# Patient Record
Sex: MALE | Race: WHITE | Employment: OTHER | URBAN - METROPOLITAN AREA
[De-identification: names, ages, dates, MRNs, and addresses within clinical notes are randomized per-mention and may not be internally consistent; named-entity substitution may affect disease eponyms.]

---

## 2019-10-27 ENCOUNTER — HOSPITAL ENCOUNTER (EMERGENCY)
Age: 69
Discharge: HOME | End: 2019-10-27
Attending: EMERGENCY MEDICINE
Payer: COMMERCIAL

## 2019-10-27 ENCOUNTER — APPOINTMENT (OUTPATIENT)
Dept: CT IMAGING | Age: 69
End: 2019-10-27
Attending: EMERGENCY MEDICINE
Payer: COMMERCIAL

## 2019-10-27 VITALS
HEART RATE: 70 BPM | RESPIRATION RATE: 16 BRPM | WEIGHT: 180 LBS | BODY MASS INDEX: 28.25 KG/M2 | OXYGEN SATURATION: 97 % | HEIGHT: 67 IN | DIASTOLIC BLOOD PRESSURE: 79 MMHG | SYSTOLIC BLOOD PRESSURE: 138 MMHG | TEMPERATURE: 98.2 F

## 2019-10-27 DIAGNOSIS — N23 RENAL COLIC: Primary | ICD-10-CM

## 2019-10-27 LAB
ALBUMIN SERPL-MCNC: 3.8 G/DL (ref 3.2–4.6)
ALBUMIN/GLOB SERPL: 1.1 {RATIO} (ref 1.2–3.5)
ALP SERPL-CCNC: 117 U/L (ref 50–136)
ALT SERPL-CCNC: 17 U/L (ref 12–65)
ANION GAP SERPL CALC-SCNC: 8 MMOL/L (ref 7–16)
AST SERPL-CCNC: 13 U/L (ref 15–37)
BACTERIA URNS QL MICRO: 0 /HPF
BASOPHILS # BLD: 0.1 K/UL (ref 0–0.2)
BASOPHILS NFR BLD: 1 % (ref 0–2)
BILIRUB SERPL-MCNC: 0.4 MG/DL (ref 0.2–1.1)
BUN SERPL-MCNC: 14 MG/DL (ref 8–23)
CALCIUM SERPL-MCNC: 9.2 MG/DL (ref 8.3–10.4)
CASTS URNS QL MICRO: ABNORMAL /LPF
CHLORIDE SERPL-SCNC: 104 MMOL/L (ref 98–107)
CO2 SERPL-SCNC: 25 MMOL/L (ref 21–32)
CREAT SERPL-MCNC: 1.26 MG/DL (ref 0.8–1.5)
CRYSTALS URNS QL MICRO: 0 /LPF
DIFFERENTIAL METHOD BLD: ABNORMAL
EOSINOPHIL # BLD: 0.1 K/UL (ref 0–0.8)
EOSINOPHIL NFR BLD: 1 % (ref 0.5–7.8)
EPI CELLS #/AREA URNS HPF: ABNORMAL /HPF
ERYTHROCYTE [DISTWIDTH] IN BLOOD BY AUTOMATED COUNT: 13.9 % (ref 11.9–14.6)
GLOBULIN SER CALC-MCNC: 3.5 G/DL (ref 2.3–3.5)
GLUCOSE SERPL-MCNC: 104 MG/DL (ref 65–100)
HCT VFR BLD AUTO: 46.3 % (ref 41.1–50.3)
HGB BLD-MCNC: 15.2 G/DL (ref 13.6–17.2)
IMM GRANULOCYTES # BLD AUTO: 0.1 K/UL (ref 0–0.5)
IMM GRANULOCYTES NFR BLD AUTO: 1 % (ref 0–5)
LYMPHOCYTES # BLD: 3 K/UL (ref 0.5–4.6)
LYMPHOCYTES NFR BLD: 23 % (ref 13–44)
MCH RBC QN AUTO: 29.6 PG (ref 26.1–32.9)
MCHC RBC AUTO-ENTMCNC: 32.8 G/DL (ref 31.4–35)
MCV RBC AUTO: 90.3 FL (ref 79.6–97.8)
MONOCYTES # BLD: 0.8 K/UL (ref 0.1–1.3)
MONOCYTES NFR BLD: 6 % (ref 4–12)
MUCOUS THREADS URNS QL MICRO: ABNORMAL /LPF
NEUTS SEG # BLD: 8.8 K/UL (ref 1.7–8.2)
NEUTS SEG NFR BLD: 68 % (ref 43–78)
NRBC # BLD: 0 K/UL (ref 0–0.2)
PLATELET # BLD AUTO: 428 K/UL (ref 150–450)
PMV BLD AUTO: 9.2 FL (ref 9.4–12.3)
POTASSIUM SERPL-SCNC: 3.9 MMOL/L (ref 3.5–5.1)
PROT SERPL-MCNC: 7.3 G/DL (ref 6.3–8.2)
RBC # BLD AUTO: 5.13 M/UL (ref 4.23–5.6)
RBC #/AREA URNS HPF: >100 /HPF
SODIUM SERPL-SCNC: 137 MMOL/L (ref 136–145)
WBC # BLD AUTO: 12.9 K/UL (ref 4.3–11.1)
WBC URNS QL MICRO: ABNORMAL /HPF

## 2019-10-27 PROCEDURE — 81003 URINALYSIS AUTO W/O SCOPE: CPT | Performed by: EMERGENCY MEDICINE

## 2019-10-27 PROCEDURE — 99284 EMERGENCY DEPT VISIT MOD MDM: CPT | Performed by: EMERGENCY MEDICINE

## 2019-10-27 PROCEDURE — 74011250636 HC RX REV CODE- 250/636: Performed by: EMERGENCY MEDICINE

## 2019-10-27 PROCEDURE — 51798 US URINE CAPACITY MEASURE: CPT | Performed by: EMERGENCY MEDICINE

## 2019-10-27 PROCEDURE — 85025 COMPLETE CBC W/AUTO DIFF WBC: CPT

## 2019-10-27 PROCEDURE — 96361 HYDRATE IV INFUSION ADD-ON: CPT | Performed by: EMERGENCY MEDICINE

## 2019-10-27 PROCEDURE — 74011250637 HC RX REV CODE- 250/637: Performed by: EMERGENCY MEDICINE

## 2019-10-27 PROCEDURE — 81015 MICROSCOPIC EXAM OF URINE: CPT

## 2019-10-27 PROCEDURE — 96375 TX/PRO/DX INJ NEW DRUG ADDON: CPT | Performed by: EMERGENCY MEDICINE

## 2019-10-27 PROCEDURE — 96374 THER/PROPH/DIAG INJ IV PUSH: CPT | Performed by: EMERGENCY MEDICINE

## 2019-10-27 PROCEDURE — 80053 COMPREHEN METABOLIC PANEL: CPT

## 2019-10-27 PROCEDURE — 74176 CT ABD & PELVIS W/O CONTRAST: CPT

## 2019-10-27 RX ORDER — TAMSULOSIN HYDROCHLORIDE 0.4 MG/1
0.4 CAPSULE ORAL ONCE
Status: COMPLETED | OUTPATIENT
Start: 2019-10-27 | End: 2019-10-27

## 2019-10-27 RX ORDER — HYDROCODONE BITARTRATE AND ACETAMINOPHEN 5; 325 MG/1; MG/1
1-2 TABLET ORAL
Qty: 20 TAB | Refills: 0 | Status: SHIPPED | OUTPATIENT
Start: 2019-10-27 | End: 2019-10-30

## 2019-10-27 RX ORDER — HYDROMORPHONE HYDROCHLORIDE 1 MG/ML
1 INJECTION, SOLUTION INTRAMUSCULAR; INTRAVENOUS; SUBCUTANEOUS
Status: COMPLETED | OUTPATIENT
Start: 2019-10-27 | End: 2019-10-27

## 2019-10-27 RX ORDER — ONDANSETRON 2 MG/ML
4 INJECTION INTRAMUSCULAR; INTRAVENOUS
Status: COMPLETED | OUTPATIENT
Start: 2019-10-27 | End: 2019-10-27

## 2019-10-27 RX ORDER — TAMSULOSIN HYDROCHLORIDE 0.4 MG/1
0.4 CAPSULE ORAL DAILY
Qty: 15 CAP | Refills: 0 | Status: SHIPPED | OUTPATIENT
Start: 2019-10-27 | End: 2019-11-11

## 2019-10-27 RX ORDER — IBUPROFEN 800 MG/1
800 TABLET ORAL
Qty: 20 TAB | Refills: 0 | Status: SHIPPED | OUTPATIENT
Start: 2019-10-27 | End: 2019-11-03

## 2019-10-27 RX ORDER — DOCUSATE SODIUM 100 MG/1
200 CAPSULE, LIQUID FILLED ORAL DAILY
Qty: 62 CAP | Refills: 0 | Status: SHIPPED | OUTPATIENT
Start: 2019-10-27 | End: 2019-10-28

## 2019-10-27 RX ORDER — KETOROLAC TROMETHAMINE 30 MG/ML
30 INJECTION, SOLUTION INTRAMUSCULAR; INTRAVENOUS
Status: COMPLETED | OUTPATIENT
Start: 2019-10-27 | End: 2019-10-27

## 2019-10-27 RX ORDER — ONDANSETRON 8 MG/1
8 TABLET, ORALLY DISINTEGRATING ORAL
Qty: 12 TAB | Refills: 1 | Status: SHIPPED | OUTPATIENT
Start: 2019-10-27

## 2019-10-27 RX ADMIN — TAMSULOSIN HYDROCHLORIDE 0.4 MG: 0.4 CAPSULE ORAL at 10:35

## 2019-10-27 RX ADMIN — SODIUM CHLORIDE 1000 ML: 900 INJECTION, SOLUTION INTRAVENOUS at 10:39

## 2019-10-27 RX ADMIN — HYDROMORPHONE HYDROCHLORIDE 1 MG: 1 INJECTION, SOLUTION INTRAMUSCULAR; INTRAVENOUS; SUBCUTANEOUS at 10:35

## 2019-10-27 RX ADMIN — KETOROLAC TROMETHAMINE 30 MG: 30 INJECTION, SOLUTION INTRAMUSCULAR at 10:35

## 2019-10-27 RX ADMIN — ONDANSETRON 4 MG: 2 INJECTION INTRAMUSCULAR; INTRAVENOUS at 10:35

## 2019-10-27 NOTE — ED NOTES
I have reviewed discharge instructions with the patient and spouse. The patient and spouse verbalized understanding. Patient left ED via Discharge Method: ambulatory to Home with spouse. Opportunity for questions and clarification provided. Patient given 5 scripts. To continue your aftercare when you leave the hospital, you may receive an automated call from our care team to check in on how you are doing. This is a free service and part of our promise to provide the best care and service to meet your aftercare needs.  If you have questions, or wish to unsubscribe from this service please call [phone]. Thank you for Choosing our [facility] Emergency Department.

## 2019-10-27 NOTE — ED PROVIDER NOTES
25-year-old gentleman with history of 5 or 6 kidney stones in the past some requiring intervention presents with flank pain on the left side onset last night around 9 PM.  Patient has had nausea vomiting, states the pain is sharp and radiates to the left lower quadrant. Patient indicates the pain is similar to previous episodes of renal colic. Patient states he \"cannot pee\", and \"cannot move his bowels\". Last bowel movement was yesterday morning, patient voided a small amount at triage and had a post void residual by bladder scan of May be 69 cc. Past Medical History:   Diagnosis Date    Chronic back pain        No past surgical history on file. No family history on file.     Social History     Socioeconomic History    Marital status:      Spouse name: Not on file    Number of children: Not on file    Years of education: Not on file    Highest education level: Not on file   Occupational History    Not on file   Social Needs    Financial resource strain: Not on file    Food insecurity:     Worry: Not on file     Inability: Not on file    Transportation needs:     Medical: Not on file     Non-medical: Not on file   Tobacco Use    Smoking status: Not on file   Substance and Sexual Activity    Alcohol use: Not on file    Drug use: Not on file    Sexual activity: Not on file   Lifestyle    Physical activity:     Days per week: Not on file     Minutes per session: Not on file    Stress: Not on file   Relationships    Social connections:     Talks on phone: Not on file     Gets together: Not on file     Attends [employer] service: Not on file     Active member of club or organization: Not on file     Attends meetings of clubs or organizations: Not on file     Relationship status: Not on file    Intimate partner violence:     Fear of current or ex partner: Not on file     Emotionally abused: Not on file     Physically abused: Not on file     Forced sexual activity: Not on file Other Topics Concern    Not on file   Social History Narrative    Not on file         ALLERGIES: Patient has no known allergies. Review of Systems   Constitutional: Negative for chills and fever. HENT: Negative for rhinorrhea and sore throat. Eyes: Negative for discharge and redness. Respiratory: Negative for cough and shortness of breath. Cardiovascular: Negative for chest pain and palpitations. Gastrointestinal: Positive for abdominal pain, constipation, nausea and vomiting. Negative for diarrhea. Genitourinary: Positive for difficulty urinating. Musculoskeletal: Positive for back pain. Negative for arthralgias. Skin: Negative for rash. Neurological: Negative for dizziness and headaches. All other systems reviewed and are negative. Vitals:    10/27/19 0949   BP: (!) 160/91   Pulse: 89   Resp: 16   Temp: 98.7 °F (37.1 °C)   SpO2: 99%   Weight: 81.6 kg (180 lb)   Height: 5' 7\" (1.702 m)            Physical Exam   Constitutional: He is oriented to person, place, and time. He appears well-developed and well-nourished. He appears distressed. HENT:   Head: Normocephalic and atraumatic. Eyes: Pupils are equal, round, and reactive to light. Conjunctivae are normal. Right eye exhibits no discharge. Left eye exhibits no discharge. No scleral icterus. Neck: Normal range of motion. Neck supple. Cardiovascular: Normal rate, regular rhythm and normal heart sounds. Exam reveals no gallop. No murmur heard. Pulmonary/Chest: Effort normal and breath sounds normal. No respiratory distress. He has no wheezes. He has no rales. Abdominal: Soft. There is tenderness in the left upper quadrant and left lower quadrant. There is CVA tenderness. There is no guarding. Musculoskeletal: Normal range of motion. He exhibits tenderness. He exhibits no edema. Thoracic back: He exhibits tenderness. He exhibits no bony tenderness.         Back:    Neurological: He is alert and oriented to person, place, and time. He exhibits normal muscle tone. cni 2-12 grossly   Skin: Skin is warm and dry. He is not diaphoretic. Psychiatric: He has a normal mood and affect. His behavior is normal.   Nursing note and vitals reviewed. MDM  Number of Diagnoses or Management Options  Diagnosis management comments: Medical decision making note:  Left flank pain suggestive of renal colic. Will check urine labs and CT scan. Patient reports constipation but has had a bowel movement within the last 24 hours  This concludes the \"medical decision making note\" part of this emergency department visit note.            Procedures

## 2019-10-27 NOTE — DISCHARGE INSTRUCTIONS
Take flomax (starting tomorrow) - once a day till stone has passed  Phenergan as needed for nasuea  Ibuprofen as needed for milder pain  norco as needed for stronger pain  Strain urine  Return to the e.r. if worse, fever>101, signs of urinary infection, or pain is out of control despite medicines  Follow up with urologist provided if stone hasn't passed in a few days      Patient Education        Kidney Stone: Care Instructions  Your Care Instructions    Kidney stones are formed when salts, minerals, and other substances normally found in the urine clump together. They can be as small as grains of sand or, rarely, as large as golf balls. While the stone is traveling through the ureter, which is the tube that carries urine from the kidney to the bladder, you will probably feel pain. The pain may be mild or very severe. You may also have some blood in your urine. As soon as the stone reaches the bladder, any intense pain should go away. If a stone is too large to pass on its own, you may need a medical procedure to help you pass the stone. The doctor has checked you carefully, but problems can develop later. If you notice any problems or new symptoms, get medical treatment right away. Follow-up care is a key part of your treatment and safety. Be sure to make and go to all appointments, and call your doctor if you are having problems. It's also a good idea to know your test results and keep a list of the medicines you take. How can you care for yourself at home? · Drink plenty of fluids, enough so that your urine is light yellow or clear like water. If you have kidney, heart, or liver disease and have to limit fluids, talk with your doctor before you increase the amount of fluids you drink. · Take pain medicines exactly as directed. Call your doctor if you think you are having a problem with your medicine. ? If the doctor gave you a prescription medicine for pain, take it as prescribed.   ? If you are not taking a prescription pain medicine, ask your doctor if you can take an over-the-counter medicine. Read and follow all instructions on the label. · Your doctor may ask you to strain your urine so that you can collect your kidney stone when it passes. You can use a kitchen strainer or a tea strainer to catch the stone. Store it in a plastic bag until you see your doctor again. Preventing future kidney stones  Some changes in your diet may help prevent kidney stones. Depending on the cause of your stones, your doctor may recommend that you:  · Drink plenty of fluids, enough so that your urine is light yellow or clear like water. If you have kidney, heart, or liver disease and have to limit fluids, talk with your doctor before you increase the amount of fluids you drink. · Limit coffee, tea, and alcohol. Also avoid grapefruit juice. · Do not take more than the recommended daily dose of vitamins C and D.  · Avoid antacids such as Gaviscon, Maalox, Mylanta, or Tums. · Limit the amount of salt (sodium) in your diet. · Eat a balanced diet that is not too high in protein. · Limit foods that are high in a substance called oxalate, which can cause kidney stones. These foods include dark green vegetables, rhubarb, chocolate, wheat bran, nuts, cranberries, and beans. When should you call for help? Call your doctor now or seek immediate medical care if:    · You cannot keep down fluids.     · Your pain gets worse.     · You have a fever or chills.     · You have new or worse pain in your back just below your rib cage (the flank area).     · You have new or more blood in your urine.    Watch closely for changes in your health, and be sure to contact your doctor if:    · You do not get better as expected. Where can you learn more? Go to http://johnathon-elza.info/. Enter Z610 in the search box to learn more about \"Kidney Stone: Care Instructions. \"  Current as of: October 31, 2018  Content Version: 12.2  © 1831-5105 [employer]. Care instructions adapted under license by [employer] (which disclaims liability or warranty for this information). If you have questions about a medical condition or this instruction, always ask your healthcare professional. [address] any warranty or liability for your use of this information.

## 2019-10-27 NOTE — ED TRIAGE NOTES
Pt walks into triage with c/o of flank pain. Pt hx of kidney stones. States it has been 8 hours since he has urinated. Denies hematuria when he did urinate. Pt states fever feeling. Did not take temp at home.  Pt in pain

## 2024-06-28 DIAGNOSIS — R06.83 SNORING: Primary | ICD-10-CM

## 2024-06-28 NOTE — PROGRESS NOTES
Janeen Nicole, Domitila BROWN, MA  Outside referral from [facility] Fat[facility]. Comprehensive auth notes sx snoring, witnessed apneas and excessive daytime sleepiness. No previous study     [id number]  preliminary exp 11/16/24

## 2024-11-27 ENCOUNTER — PREP FOR PROCEDURE (OUTPATIENT)
Dept: SURGERY | Age: 74
End: 2024-11-27

## 2024-11-27 ENCOUNTER — OFFICE VISIT (OUTPATIENT)
Dept: SURGERY | Age: 74
End: 2024-11-27
Payer: OTHER GOVERNMENT

## 2024-11-27 VITALS
HEART RATE: 84 BPM | DIASTOLIC BLOOD PRESSURE: 86 MMHG | HEIGHT: 68 IN | WEIGHT: 182 LBS | BODY MASS INDEX: 27.58 KG/M2 | SYSTOLIC BLOOD PRESSURE: 144 MMHG

## 2024-11-27 DIAGNOSIS — L72.3 SEBACEOUS CYST: ICD-10-CM

## 2024-11-27 DIAGNOSIS — L72.3 SEBACEOUS CYST: Primary | ICD-10-CM

## 2024-11-27 PROCEDURE — 99203 OFFICE O/P NEW LOW 30 MIN: CPT | Performed by: SURGERY

## 2024-11-27 PROCEDURE — 1123F ACP DISCUSS/DSCN MKR DOCD: CPT | Performed by: SURGERY

## 2024-11-27 NOTE — PROGRESS NOTES
GENERAL SURGERY NEW PATIENT NOTE    PRIMARY CARE PROVIDER:No primary care provider on file.    REFERRING PHYSICIAN: Donna Mchugh    Dear Dr. Mchugh,    I had the pleasure of seeing your patient, MARI Camacho, in the General Surgery Clinic at [facility]. My findings and recommendations are as follows:    Date of visit: 11/27/24    CHIEF COMPLAINT: Sebaceous cyst abdomen and cheek    HISTORY OF PRESENT ILLNESS: MARI Camacho is a 74 y.o. male with history of chronic back pain, nephrolithiasis, and diverticulosis referred to me for evaluation of sebaceous cyst on the abdomen and cheek.        Prior Abdominal Surgery: None  Cardiac History: None  Pulmonary History: None  Blood Thinners: None      The following labs/images/reports were reviewed:    Imaging:    CT Result (most recent):  CT ABDOMEN PELVIS WO IV CONTRAST 10/27/2019    Narrative  CT abdomen and pelvis without contrast    CLINICAL INDICATION: Acute moderate left flank pain, subjective fever, dysuria;  history of left renal stones, scoliosis. Labs demonstrate leukocytosis today.    COMPARISON: CT 1/19/2013 and 10/11/2006    TECHNIQUE: Automated exposure Control was used. Multiple contiguous axial CT  images were obtained through the abdomen and pelvis without intravenous or oral  contrast. Coronal reformatted images are obtained to further evaluate organs.    FINDINGS: Partially included lung bases are unremarkable.    Abdomen: Right kidney and ureter demonstrate no stone or dilatation. Left ureter  demonstrates a 5 mm stone at the level of the pelvic inlet, with mild  hydroureteronephrosis and perinephric stranding, and a nonobstructing 3 mm stone  in the left renal collecting system. There is no evidence of renal mass on  limited non-contrast evaluation.    No acute abnormalities are seen in the noncontrasted visualized portions of the  liver or spleen.  No suspicious gallbladder, adrenal or pancreas lesions. Tiny  unchanged hypodensity in the 
exposure Control was used. Multiple contiguous axial CT  images were obtained through the abdomen and pelvis without intravenous or oral  contrast. Coronal reformatted images are obtained to further evaluate organs.    FINDINGS: Partially included lung bases are unremarkable.    Abdomen: Right kidney and ureter demonstrate no stone or dilatation. Left ureter  demonstrates a 5 mm stone at the level of the pelvic inlet, with mild  hydroureteronephrosis and perinephric stranding, and a nonobstructing 3 mm stone  in the left renal collecting system. There is no evidence of renal mass on  limited non-contrast evaluation.    No acute abnormalities are seen in the noncontrasted visualized portions of the  liver or spleen.  No suspicious gallbladder, adrenal or pancreas lesions. Tiny  unchanged hypodensity in the spleen of doubtful significance. No free air, or  focal inflammatory changes or fluid collections in the abdomen.  No evidence of  bowel obstruction. Diverticulosis of large bowel with no obvious diverticulitis  on limited noncontrast evaluation. Moderate stool. Normal right lower quadrant  appendix. Scattered calcifications of aorta. Aorta normal caliber.    Pelvis CT: There are no other distal ureteral or bladder calculi. Urinary  bladder is mostly decompressed which may account for nonspecific wall  thickening, or cystitis could do this. Prostate does not appear enlarged. No  focal inflammatory changes or fluid collections in the pelvis. No evidence of  lymphadenopathy. Suggestion of small bilateral varicoceles. Small fat-containing  inguinal hernia on the right, slightly increased since prior.    Bones: No acute osseous lesion. Mild scoliosis and degenerative changes again  noted of spine.    Impression  IMPRESSION:  1. Left renal and ureteral stones, mild hydroureteronephrosis.  2. Diverticulosis.       REVIEW OF SYSTEMS:    Constitutional: no weight loss, no fevers or night sweats    Eyes: No pain, redness or

## 2024-11-27 NOTE — H&P (VIEW-ONLY)
GENERAL SURGERY NEW PATIENT NOTE    PRIMARY CARE PROVIDER:No primary care provider on file.    REFERRING PHYSICIAN: Donna Mchugh    Dear Dr. Mchugh,    I had the pleasure of seeing your patient, MARI Camacho, in the General Surgery Clinic at [facility]. My findings and recommendations are as follows:    Date of visit: 11/27/24    CHIEF COMPLAINT: Sebaceous cyst abdomen and cheek    HISTORY OF PRESENT ILLNESS: MARI Camacho is a 74 y.o. male with history of chronic back pain, nephrolithiasis, and diverticulosis referred to me for evaluation of sebaceous cyst on the abdomen and cheek.    Patient reports he first noticed a lump over his right upper quadrant abdomen and left cheek approximately 5 years ago that started out as a blackhead at both locations.  He states that both locations of the lump slowly grew over time.  He states the abdominal masses gotten the biggest.  He also describes a small mass at the left shoulder that started about a year ago also was a blackhead and continue to slowly grow over time.  He describes that all 3 of these masses are somewhat uncomfortable and bothersome as they have enlarged over time.  He denies any drainage or redness over the areas.  He has never had any localized pain over these areas.  He denies any fevers, chills, chest pain, shortness of breath, dizziness, lightheadedness, nausea, vomiting, diarrhea, unintentional weight loss, or night sweats.      Prior Abdominal Surgery: None  Cardiac History: None  Pulmonary History: None  Blood Thinners: None      The following labs/images/reports were reviewed:    Imaging:    CT Result (most recent):  CT ABDOMEN PELVIS WO IV CONTRAST 10/27/2019    Narrative  CT abdomen and pelvis without contrast    CLINICAL INDICATION: Acute moderate left flank pain, subjective fever, dysuria;  history of left renal stones, scoliosis. Labs demonstrate leukocytosis today.    COMPARISON: CT 1/19/2013 and 10/11/2006    TECHNIQUE: Automated

## 2024-11-28 RX ORDER — SODIUM CHLORIDE 0.9 % (FLUSH) 0.9 %
5-40 SYRINGE (ML) INJECTION EVERY 12 HOURS SCHEDULED
Status: CANCELLED | OUTPATIENT
Start: 2024-11-28

## 2024-11-28 RX ORDER — SODIUM CHLORIDE 0.9 % (FLUSH) 0.9 %
5-40 SYRINGE (ML) INJECTION PRN
Status: CANCELLED | OUTPATIENT
Start: 2024-11-28

## 2024-11-28 RX ORDER — SODIUM CHLORIDE 9 MG/ML
INJECTION, SOLUTION INTRAVENOUS PRN
Status: CANCELLED | OUTPATIENT
Start: 2024-11-28

## 2024-12-10 RX ORDER — ACETAMINOPHEN 500 MG
1000 TABLET ORAL EVERY 6 HOURS PRN
COMMUNITY

## 2024-12-10 RX ORDER — TAMSULOSIN HYDROCHLORIDE 0.4 MG/1
0.4 CAPSULE ORAL DAILY
COMMUNITY

## 2024-12-10 RX ORDER — METHOCARBAMOL 500 MG/1
500 TABLET, FILM COATED ORAL NIGHTLY
COMMUNITY

## 2024-12-10 RX ORDER — FAMOTIDINE 20 MG/1
20 TABLET, FILM COATED ORAL 2 TIMES DAILY
COMMUNITY

## 2024-12-10 NOTE — PERIOP NOTE
Patient verified name and .  Order for consent  was found in EHR and does match case posting; patient verifies procedure.   Type 1B surgery, phone assessment complete.  Orders  received.  Labs per surgeon: none ordered  Labs per anesthesia protocol: not indicated    Patient answered medical/surgical history questions at their best of ability. All prior to admission medications documented in EPIC.    Patient instructed to continue taking all prescription medications up to the day of surgery but to take only the following medications the day of surgery according to anesthesia guidelines with a small sip of water: tramadol if needed, tamsulosin, famotidine.  Also, patient is requested to take 2 Tylenol in the morning and then again before bed on the day before surgery. Regular or extra strength may be used.       Patient informed that all vitamins and supplements should be held 7 days prior to surgery and NSAIDS 5 days prior to surgery. Prescription meds to hold:  none    Patient instructed on the following:    > Arrive at main Entrance, time of arrival to be called the day before by 1700  > NPO after midnight, unless otherwise indicated, including gum, mints, and ice chips  > Please drink 32 ounces of non-caffeinated clear liquids 2 hours prior to your arrival to avoid dehydration. (Per anesthesia)  > Responsible adult must drive patient to the hospital, stay during surgery, and patient will need supervision 24 hours after anesthesia  > Use non moisturizing soap in shower the night before surgery and on the morning of surgery  > All piercings must be removed prior to arrival.    > Leave all valuables (money and jewelry) at home but bring insurance card and ID on DOS.   > You may be required to pay a deductible or co-pay on the day of your procedure. You can pre-pay by calling [phone] if your surgery is at the [facility] or [phone] if your surgery is at the [facility].  > Do not wear make-up, nail

## 2024-12-16 ENCOUNTER — ANESTHESIA (OUTPATIENT)
Dept: SURGERY | Age: 74
End: 2024-12-16
Payer: OTHER GOVERNMENT

## 2024-12-16 ENCOUNTER — HOSPITAL ENCOUNTER (OUTPATIENT)
Age: 74
Setting detail: OUTPATIENT SURGERY
Discharge: HOME | End: 2024-12-16
Attending: SURGERY | Admitting: SURGERY
Payer: OTHER GOVERNMENT

## 2024-12-16 ENCOUNTER — ANESTHESIA EVENT (OUTPATIENT)
Dept: SURGERY | Age: 74
End: 2024-12-16
Payer: OTHER GOVERNMENT

## 2024-12-16 VITALS
BODY MASS INDEX: 28.19 KG/M2 | RESPIRATION RATE: 16 BRPM | WEIGHT: 186 LBS | OXYGEN SATURATION: 97 % | TEMPERATURE: 97.7 F | SYSTOLIC BLOOD PRESSURE: 118 MMHG | HEIGHT: 68 IN | HEART RATE: 55 BPM | DIASTOLIC BLOOD PRESSURE: 72 MMHG

## 2024-12-16 DIAGNOSIS — L72.3 SEBACEOUS CYST: Primary | ICD-10-CM

## 2024-12-16 PROCEDURE — 3700000000 HC ANESTHESIA ATTENDED CARE: Performed by: SURGERY

## 2024-12-16 PROCEDURE — 3600000012 HC SURGERY LEVEL 2 ADDTL 15MIN: Performed by: SURGERY

## 2024-12-16 PROCEDURE — 2709999900 HC NON-CHARGEABLE SUPPLY: Performed by: SURGERY

## 2024-12-16 PROCEDURE — 6360000002 HC RX W HCPCS: Performed by: REGISTERED NURSE

## 2024-12-16 PROCEDURE — 7100000011 HC PHASE II RECOVERY - ADDTL 15 MIN: Performed by: SURGERY

## 2024-12-16 PROCEDURE — 3600000002 HC SURGERY LEVEL 2 BASE: Performed by: SURGERY

## 2024-12-16 PROCEDURE — 6360000002 HC RX W HCPCS: Performed by: SURGERY

## 2024-12-16 PROCEDURE — 3700000001 HC ADD 15 MINUTES (ANESTHESIA): Performed by: SURGERY

## 2024-12-16 PROCEDURE — 12032 INTMD RPR S/A/T/EXT 2.6-7.5: CPT | Performed by: SURGERY

## 2024-12-16 PROCEDURE — 11406 EXC TR-EXT B9+MARG >4.0 CM: CPT | Performed by: SURGERY

## 2024-12-16 PROCEDURE — 6370000000 HC RX 637 (ALT 250 FOR IP): Performed by: ANESTHESIOLOGY

## 2024-12-16 PROCEDURE — 2580000003 HC RX 258: Performed by: SURGERY

## 2024-12-16 PROCEDURE — 7100000010 HC PHASE II RECOVERY - FIRST 15 MIN: Performed by: SURGERY

## 2024-12-16 PROCEDURE — 11442 EXC FACE-MM B9+MARG 1.1-2 CM: CPT | Performed by: SURGERY

## 2024-12-16 PROCEDURE — 11401 EXC TR-EXT B9+MARG 0.6-1 CM: CPT | Performed by: SURGERY

## 2024-12-16 PROCEDURE — 88304 TISSUE EXAM BY PATHOLOGIST: CPT

## 2024-12-16 PROCEDURE — 7100000000 HC PACU RECOVERY - FIRST 15 MIN: Performed by: SURGERY

## 2024-12-16 PROCEDURE — 2500000003 HC RX 250 WO HCPCS: Performed by: SURGERY

## 2024-12-16 RX ORDER — NALOXONE HYDROCHLORIDE 0.4 MG/ML
INJECTION, SOLUTION INTRAMUSCULAR; INTRAVENOUS; SUBCUTANEOUS PRN
Status: DISCONTINUED | OUTPATIENT
Start: 2024-12-16 | End: 2024-12-16 | Stop reason: HOSPADM

## 2024-12-16 RX ORDER — SODIUM CHLORIDE 0.9 % (FLUSH) 0.9 %
5-40 SYRINGE (ML) INJECTION PRN
Status: DISCONTINUED | OUTPATIENT
Start: 2024-12-16 | End: 2024-12-16 | Stop reason: HOSPADM

## 2024-12-16 RX ORDER — SODIUM CHLORIDE 9 MG/ML
INJECTION, SOLUTION INTRAVENOUS PRN
Status: DISCONTINUED | OUTPATIENT
Start: 2024-12-16 | End: 2024-12-16 | Stop reason: HOSPADM

## 2024-12-16 RX ORDER — LIDOCAINE HYDROCHLORIDE 10 MG/ML
1 INJECTION, SOLUTION INFILTRATION; PERINEURAL
Status: DISCONTINUED | OUTPATIENT
Start: 2024-12-16 | End: 2024-12-16 | Stop reason: HOSPADM

## 2024-12-16 RX ORDER — SODIUM CHLORIDE, SODIUM LACTATE, POTASSIUM CHLORIDE, CALCIUM CHLORIDE 600; 310; 30; 20 MG/100ML; MG/100ML; MG/100ML; MG/100ML
INJECTION, SOLUTION INTRAVENOUS CONTINUOUS
Status: DISCONTINUED | OUTPATIENT
Start: 2024-12-16 | End: 2024-12-16 | Stop reason: HOSPADM

## 2024-12-16 RX ORDER — OXYCODONE HYDROCHLORIDE 5 MG/1
10 TABLET ORAL PRN
Status: DISCONTINUED | OUTPATIENT
Start: 2024-12-16 | End: 2024-12-16 | Stop reason: HOSPADM

## 2024-12-16 RX ORDER — SODIUM CHLORIDE 0.9 % (FLUSH) 0.9 %
5-40 SYRINGE (ML) INJECTION EVERY 12 HOURS SCHEDULED
Status: DISCONTINUED | OUTPATIENT
Start: 2024-12-16 | End: 2024-12-16 | Stop reason: HOSPADM

## 2024-12-16 RX ORDER — ONDANSETRON 2 MG/ML
4 INJECTION INTRAMUSCULAR; INTRAVENOUS
Status: DISCONTINUED | OUTPATIENT
Start: 2024-12-16 | End: 2024-12-16 | Stop reason: HOSPADM

## 2024-12-16 RX ORDER — TRAMADOL HYDROCHLORIDE 50 MG/1
50 TABLET ORAL EVERY 6 HOURS PRN
Qty: 2 TABLET | Refills: 0 | Status: SHIPPED | OUTPATIENT
Start: 2024-12-16 | End: 2024-12-17

## 2024-12-16 RX ORDER — DIPHENHYDRAMINE HYDROCHLORIDE 50 MG/ML
12.5 INJECTION INTRAMUSCULAR; INTRAVENOUS
Status: DISCONTINUED | OUTPATIENT
Start: 2024-12-16 | End: 2024-12-16 | Stop reason: HOSPADM

## 2024-12-16 RX ORDER — BUPIVACAINE HYDROCHLORIDE AND EPINEPHRINE 5; 5 MG/ML; UG/ML
INJECTION, SOLUTION EPIDURAL; INTRACAUDAL; PERINEURAL PRN
Status: DISCONTINUED | OUTPATIENT
Start: 2024-12-16 | End: 2024-12-16 | Stop reason: ALTCHOICE

## 2024-12-16 RX ORDER — MIDAZOLAM HYDROCHLORIDE 2 MG/2ML
2 INJECTION, SOLUTION INTRAMUSCULAR; INTRAVENOUS
Status: DISCONTINUED | OUTPATIENT
Start: 2024-12-16 | End: 2024-12-16 | Stop reason: HOSPADM

## 2024-12-16 RX ORDER — OXYCODONE HYDROCHLORIDE 5 MG/1
5 TABLET ORAL PRN
Status: DISCONTINUED | OUTPATIENT
Start: 2024-12-16 | End: 2024-12-16 | Stop reason: HOSPADM

## 2024-12-16 RX ORDER — PROCHLORPERAZINE EDISYLATE 5 MG/ML
5 INJECTION INTRAMUSCULAR; INTRAVENOUS
Status: DISCONTINUED | OUTPATIENT
Start: 2024-12-16 | End: 2024-12-16 | Stop reason: HOSPADM

## 2024-12-16 RX ORDER — ACETAMINOPHEN 500 MG
1000 TABLET ORAL ONCE
Status: COMPLETED | OUTPATIENT
Start: 2024-12-16 | End: 2024-12-16

## 2024-12-16 RX ORDER — PROPOFOL 10 MG/ML
INJECTION, EMULSION INTRAVENOUS
Status: DISCONTINUED | OUTPATIENT
Start: 2024-12-16 | End: 2024-12-16 | Stop reason: SDUPTHER

## 2024-12-16 RX ORDER — HYDROMORPHONE HYDROCHLORIDE 2 MG/ML
0.5 INJECTION, SOLUTION INTRAMUSCULAR; INTRAVENOUS; SUBCUTANEOUS EVERY 5 MIN PRN
Status: DISCONTINUED | OUTPATIENT
Start: 2024-12-16 | End: 2024-12-16 | Stop reason: HOSPADM

## 2024-12-16 RX ADMIN — PROPOFOL 40 MG: 10 INJECTION, EMULSION INTRAVENOUS at 08:12

## 2024-12-16 RX ADMIN — PHENYLEPHRINE HYDROCHLORIDE 200 MCG: 0.1 INJECTION, SOLUTION INTRAVENOUS at 08:57

## 2024-12-16 RX ADMIN — CEFAZOLIN 2000 MG: 2 INJECTION, POWDER, FOR SOLUTION INTRAMUSCULAR; INTRAVENOUS at 08:15

## 2024-12-16 RX ADMIN — ACETAMINOPHEN 1000 MG: 500 TABLET, FILM COATED ORAL at 07:48

## 2024-12-16 RX ADMIN — PROPOFOL 140 MCG/KG/MIN: 10 INJECTION, EMULSION INTRAVENOUS at 08:13

## 2024-12-16 ASSESSMENT — LIFESTYLE VARIABLES: SMOKING_STATUS: 1

## 2024-12-16 ASSESSMENT — PAIN - FUNCTIONAL ASSESSMENT
PAIN_FUNCTIONAL_ASSESSMENT: 0-10
PAIN_FUNCTIONAL_ASSESSMENT: NONE - DENIES PAIN

## 2024-12-16 NOTE — ANESTHESIA POSTPROCEDURE EVALUATION
Department of Anesthesiology  Postprocedure Note    Patient: MARI Camacho Jr.  MRN: [id number]  YOB: 1950  Date of evaluation: 12/16/2024    Procedure Summary       Date: 12/16/24 Room / Location: [facility] MAIN OR  / [facility] MAIN OR    Anesthesia Start: 0805 Anesthesia Stop: 0932    Procedures:       EXCISION OF RIGHT UPPER QUADRANT ABDOMINAL SEBACEOUS CYST (Right: Abdomen)      EXCISION LEFT CHEEK SEBACEOUS CYST (Left: Face)      EXCISION LEFT SHOULDER SEBACEOUS CYST (Left: Shoulder) Diagnosis:       Sebaceous cyst      (Sebaceous cyst [L72.3])    Providers: Nayana Almanzar MD Responsible Provider: Alissa Salinas MD    Anesthesia Type: TIVA ASA Status: 2            Anesthesia Type: No value filed.    Sonia Phase I: Sonia Score: 7    Sonia Phase II: Sonia Score: 10    Anesthesia Post Evaluation    Patient location during evaluation: PACU  Patient participation: complete - patient participated  Level of consciousness: awake and alert  Airway patency: patent  Nausea & Vomiting: no nausea and no vomiting  Cardiovascular status: hemodynamically stable  Respiratory status: acceptable, nonlabored ventilation and spontaneous ventilation  Hydration status: euvolemic  Comments: /72   Pulse 55   Temp 97.7 °F (36.5 °C) (Temporal)   Resp 16   Ht 1.727 m (5' 8\")   Wt 84.4 kg (186 lb)   SpO2 97%   BMI 28.28 kg/m²     Multimodal analgesia pain management approach  Pain management: adequate and satisfactory to patient    No notable events documented.

## 2024-12-16 NOTE — INTERVAL H&P NOTE
Update History & Physical    The patient's History and Physical of November 27, 2024 was reviewed with the patient and I examined the patient. There was no change. The surgical site was confirmed by the patient and me.     Plan: The risks, benefits, expected outcome, and alternative to the recommended procedure have been discussed with the patient. Patient understands and wants to proceed with the procedure.     Electronically signed by Nayana Almanzar MD on 12/16/2024 at 7:50 AM

## 2024-12-16 NOTE — OP NOTE
at the planned site of incision.  An elliptical incision was made overlying the sebaceous cyst using a 15 blade scalpel.  Dissection was carried down through the skin subcutaneous tissue using Bovie electrocautery.  An Allis clamp was used to grab the sebaceous cyst and Bovie electrocautery was used to carefully encircled the sebaceous cyst to excise it from the surrounding adipose tissue.  The base of the cyst extended down to the zygomaticus muscle.  The cyst was measured to be 2 cm x 2 cm x 1 cm in size.  The specimen was removed from the field and sent to pathology for routine examination.  The wound was irrigated using the remaining local anesthetic.  Meticulous hemostasis was ensured using Bovie electrocautery.  The deep dermis was closed and reapproximated to the deep adipose tissue using 3-0 Vicryl interrupted sutures.  4-0 Monocryl was used to close the skin.  Dermabond was applied.     Next, attention was turned to the left shoulder sebaceous cyst.  Local anesthetic was infiltrated using 0.5% Marcaine with epinephrine at the planned site of incision.  An elliptical incision was made overlying the sebaceous cyst using a 15 blade scalpel.  Dissection was carried down through the skin subcutaneous tissue using Bovie electrocautery.  An Allis clamp was used to grab the sebaceous cyst and Bovie electrocautery was used to carefully encircled the sebaceous cyst to excise it from the surrounding adipose tissue.  The base of the cyst extended down to the fascia of the underlying trapezius muscle.  The cyst was measured to be 1 cm x 1 cm x 0.5 cm in size.  The specimen was removed from the field and sent to pathology for routine examination.  The wound was irrigated using the remaining local anesthetic.  Meticulous hemostasis was ensured using Bovie electrocautery.  The deep dermis was closed and reapproximated to the deep adipose tissue using 3-0 Vicryl interrupted sutures.  4-0 Monocryl was used to close the skin.

## 2024-12-16 NOTE — PROGRESS NOTES
Spiritual Consult for Pre-surgery Prayer. Volunteer Funmilayo offered pre-op prayer.     Rev. CHRISTIANO Cazares.Olimpia.

## 2024-12-16 NOTE — DISCHARGE INSTRUCTIONS
times in case of emergency situations.    These are general instructions for a healthy lifestyle:  No smoking/ No tobacco products/ Avoid exposure to second hand smoke  Surgeon General's Warning:  Quitting smoking now greatly reduces serious risk to your health.  Obesity, smoking, and sedentary lifestyle greatly increases your risk for illness  A healthy diet, regular physical exercise & weight monitoring are important for maintaining a healthy lifestyle    You may be retaining fluid if you have a history of heart failure or if you experience any of the following symptoms:  Weight gain of 3 pounds or more overnight or 5 pounds in a week, increased swelling in our hands or feet or shortness of breath while lying flat in bed.  Please call your doctor as soon as you notice any of these symptoms; do not wait until your next office visit.

## 2024-12-16 NOTE — ANESTHESIA PRE PROCEDURE
Department of Anesthesiology  Preprocedure Note       Name:  MARI Camacho Jr.   Age:  74 y.o.  :  1950                                          MRN:  [id number]         Date:  2024      Surgeon: Surgeon(s):  Nayana Almanzar MD    Procedure: Procedure(s):  EXCISION OF RIGHT UPPER QUADRANT ABDOMINAL SEBACEOUS CYST  EXCISION LEFT CHEEK SEBACEOUS CYST  EXCISION LFT SHOULDER SEBACEOUS CYST    Medications prior to admission:   Prior to Admission medications    Medication Sig Start Date End Date Taking? Authorizing Provider   amitriptyline (ELAVIL) 25 MG tablet Take 2 tablets by mouth nightly   Yes ProviderTomeka MD   methocarbamol (ROBAXIN) 500 MG tablet Take 1 tablet by mouth nightly   Yes ProviderTomeka MD   famotidine (PEPCID) 20 MG tablet Take 1 tablet by mouth 2 times daily   Yes ProviderTomeka MD   tamsulosin (FLOMAX) 0.4 MG capsule Take 1 capsule by mouth daily   Yes Provider, MD Tomeka   Cholecalciferol (VITAMIN D-3 PO) Take by mouth Daily   Yes Provider, MD Tomeka   acetaminophen (TYLENOL) 500 MG tablet Take 2 tablets by mouth every 6 hours as needed for Pain   Yes Provider, MD Tomeka   traMADol (ULTRAM) 50 MG tablet Take 1 tablet by mouth every 6 hours as needed.   Yes Automatic Reconciliation, Ar       Current medications:    Current Facility-Administered Medications   Medication Dose Route Frequency Provider Last Rate Last Admin    lidocaine 1 % injection 1 mL  1 mL IntraDERmal Once PRN Ed Marino MD        acetaminophen (TYLENOL) tablet 1,000 mg  1,000 mg Oral Once Ed Marino MD        lactated ringers infusion   IntraVENous Continuous Ed Marino MD        sodium chloride flush 0.9 % injection 5-40 mL  5-40 mL IntraVENous 2 times per day Ed Marino MD        sodium chloride flush 0.9 % injection 5-40 mL  5-40 mL IntraVENous PRN Ed Marino MD        0.9 % sodium chloride infusion   IntraVENous PRN Ed Marino

## 2025-01-06 NOTE — PROGRESS NOTES
[facility]  [address], [address]  [place], [place] [id number] [phone]      MARI Camacho Jr.   presents for follow-up after   EXCISION LEFT CHEEK SEBACEOUS CYST  EXCISION LEFT SHOULDER SEBACEOUS CYST  EXCISION OF RIGHT UPPER QUADRANT ABDOMINAL SEBACEOUS CYST     by Dr Almanzar 12/16/24.   He reports glue off neck lesion that is open. R abdomen glue partially off/scant serous drainage.  Cheek lesion healed. He has not covered the lesions that the glue has fallen off of.     EXAM:  He  is in no distress  There is no residual tenderness in the abdomen   Incision: Neck incision is open with beefy granulation tissue.  Right abdomen with partial glue intact, no active drainage. Cheek is healed, glue is off. No seroma, no cellulitis    Pathology:   A:  Left cheek sebaceous cyst, excision:   - Benign epidermoid cyst.     B:  Left shoulder sebaceous cyst, excision:   - Benign epidermoid cyst.     C:  Right upper quadrant abdominal sebaceous cyst, excision:   - Benign epidermoid cyst.     A copy was provided to the patient.       ASSESSMENT/PLAN:  Shower daily with antibacterial liquid soap (using bar soap)  Cover with bandaid daily.  NO peroxide or neosporin to remaining 2 open incisions.  Follow up here 2 weeks. Call with new concerns.     EVIN DODD, APRN - CNP

## 2025-01-07 ENCOUNTER — OFFICE VISIT (OUTPATIENT)
Dept: SURGERY | Age: 75
End: 2025-01-07

## 2025-01-07 VITALS — WEIGHT: 186 LBS | BODY MASS INDEX: 28.19 KG/M2 | HEIGHT: 68 IN

## 2025-01-07 DIAGNOSIS — L72.3 SEBACEOUS CYST: Primary | ICD-10-CM

## 2025-01-21 ENCOUNTER — OFFICE VISIT (OUTPATIENT)
Dept: SURGERY | Age: 75
End: 2025-01-21

## 2025-01-21 VITALS — BODY MASS INDEX: 28.19 KG/M2 | WEIGHT: 186 LBS | HEIGHT: 68 IN

## 2025-01-21 DIAGNOSIS — L72.3 SEBACEOUS CYST: Primary | ICD-10-CM

## 2025-01-21 NOTE — PROGRESS NOTES
[facility]  [address], [address]  [place], [place] [id number] [phone]      MARI Camacho Jr.   presents for follow-up after   EXCISION LEFT CHEEK SEBACEOUS CYST  EXCISION LEFT SHOULDER SEBACEOUS CYST  EXCISION OF RIGHT UPPER QUADRANT ABDOMINAL SEBACEOUS CYST     by Dr Almanzar 12/16/24.   1/7/25: office visit.He reports glue off neck lesion that is open. R abdomen glue partially off/scant serous drainage.  Cheek lesion healed. He has not covered the lesions that the glue has fallen off of.   1/21/25 office visit: lesions nearly completely closed. No drainage.     EXAM:  He  is in no distress  There is no residual tenderness in the abdomen   Incision: Neck incision is closed with dry granulation tissue.  Right abdomen with 3mm skin open, no active drainage. Cheek is healed, glue is off. No seroma, no cellulitis    Pathology:   A:  Left cheek sebaceous cyst, excision:   - Benign epidermoid cyst.     B:  Left shoulder sebaceous cyst, excision:   - Benign epidermoid cyst.     C:  Right upper quadrant abdominal sebaceous cyst, excision:   - Benign epidermoid cyst.     A copy was provided to the patient.       ASSESSMENT/PLAN:  Shower daily with antibacterial liquid soap (using bar soap)  Cover R abdomen with bandaid daily until scabs over.   NO peroxide or neosporin to remaining 2 open incisions.   Call with new concerns   EVIN DODD, GLADYS - CNP

## 2025-04-08 ENCOUNTER — HOSPITAL ENCOUNTER (OUTPATIENT)
Dept: CT IMAGING | Age: 75
Discharge: HOME | End: 2025-04-10
Payer: OTHER GOVERNMENT

## 2025-04-08 DIAGNOSIS — R93.89 ABNORMAL CHEST XRAY: ICD-10-CM

## 2025-04-08 DIAGNOSIS — S37.009A INJURY OF KIDNEY, UNSPECIFIED LATERALITY, INITIAL ENCOUNTER: ICD-10-CM

## 2025-04-08 LAB — CREAT BLD-MCNC: 1.06 MG/DL (ref 0.8–1.5)

## 2025-04-08 PROCEDURE — 82565 ASSAY OF CREATININE: CPT

## 2025-04-08 PROCEDURE — 71260 CT THORAX DX C+: CPT

## 2025-04-08 PROCEDURE — 6360000004 HC RX CONTRAST MEDICATION: Performed by: INTERNAL MEDICINE

## 2025-04-08 RX ORDER — IOPAMIDOL 755 MG/ML
70 INJECTION, SOLUTION INTRAVASCULAR
Status: COMPLETED | OUTPATIENT
Start: 2025-04-08 | End: 2025-04-08

## 2025-04-08 RX ADMIN — IOPAMIDOL 70 ML: 755 INJECTION, SOLUTION INTRAVENOUS at 07:40

## (undated) DEVICE — SUTURE MONOCRYL SZ 4-0 L27IN ABSRB UD L19MM PS-2 1/2 CIR PRIM Y426H

## (undated) DEVICE — GARMENT,MEDLINE,DVT,INT,CALF,MED, GEN2: Brand: MEDLINE

## (undated) DEVICE — SUTURE VICRYL + SZ 3-0 L27IN ABSRB UD L26MM SH 1/2 CIR VCP416H

## (undated) DEVICE — GLOVE ORANGE PI 7   MSG9070

## (undated) DEVICE — NEEDLE HYPO 21GA L1.5IN INTRAMUSCULAR S STL LATCH BVL UP

## (undated) DEVICE — DRAPE TOWEL: Brand: CONVERTORS

## (undated) DEVICE — MINOR SPLIT GENERAL: Brand: MEDLINE INDUSTRIES, INC.

## (undated) DEVICE — GOWN SURG 2XL 49 IN AAMI LEVEL 3 ORBIS

## (undated) DEVICE — GLOVE SURG SZ 75 L12IN FNGR THK79MIL GRN LTX FREE

## (undated) DEVICE — SYRINGE MED 10ML LUERLOCK TIP W/O SFTY DISP

## (undated) DEVICE — LIQUIBAND RAPID ADHESIVE 36/CS 0.8ML: Brand: MEDLINE

## (undated) DEVICE — BLADE ES ELASTOMERIC COAT INSUL DURABLE BEND UPTO 90DEG

## (undated) DEVICE — ELECTRODE PT RET AD L9FT HI MOIST COND ADH HYDRGEL CORDED